# Patient Record
Sex: MALE | Race: OTHER | HISPANIC OR LATINO | Employment: UNEMPLOYED | URBAN - METROPOLITAN AREA
[De-identification: names, ages, dates, MRNs, and addresses within clinical notes are randomized per-mention and may not be internally consistent; named-entity substitution may affect disease eponyms.]

---

## 2022-04-05 ENCOUNTER — HOSPITAL ENCOUNTER (EMERGENCY)
Facility: HOSPITAL | Age: 42
Discharge: ANOTHER HOSPITAL | End: 2022-04-05
Attending: EMERGENCY MEDICINE

## 2022-04-05 VITALS
RESPIRATION RATE: 16 BRPM | OXYGEN SATURATION: 99 % | TEMPERATURE: 99 F | HEART RATE: 68 BPM | HEIGHT: 68 IN | WEIGHT: 140 LBS | BODY MASS INDEX: 21.22 KG/M2 | SYSTOLIC BLOOD PRESSURE: 123 MMHG | DIASTOLIC BLOOD PRESSURE: 62 MMHG

## 2022-04-05 DIAGNOSIS — S61.209A FLEXOR TENDON LACERATION OF FINGER WITH OPEN WOUND, INITIAL ENCOUNTER: Primary | ICD-10-CM

## 2022-04-05 DIAGNOSIS — S61.211A LACERATION OF LEFT INDEX FINGER WITHOUT FOREIGN BODY WITHOUT DAMAGE TO NAIL, INITIAL ENCOUNTER: ICD-10-CM

## 2022-04-05 DIAGNOSIS — S56.129A FLEXOR TENDON LACERATION OF FINGER WITH OPEN WOUND, INITIAL ENCOUNTER: Primary | ICD-10-CM

## 2022-04-05 DIAGNOSIS — S61.012A LACERATION OF LEFT THUMB WITHOUT FOREIGN BODY WITHOUT DAMAGE TO NAIL, INITIAL ENCOUNTER: ICD-10-CM

## 2022-04-05 PROCEDURE — 25000003 PHARM REV CODE 250: Performed by: EMERGENCY MEDICINE

## 2022-04-05 PROCEDURE — 96375 TX/PRO/DX INJ NEW DRUG ADDON: CPT

## 2022-04-05 PROCEDURE — 12001 RPR S/N/AX/GEN/TRNK 2.5CM/<: CPT

## 2022-04-05 PROCEDURE — 90715 TDAP VACCINE 7 YRS/> IM: CPT | Performed by: EMERGENCY MEDICINE

## 2022-04-05 PROCEDURE — 63600175 PHARM REV CODE 636 W HCPCS: Performed by: EMERGENCY MEDICINE

## 2022-04-05 PROCEDURE — 99285 EMERGENCY DEPT VISIT HI MDM: CPT | Mod: 25

## 2022-04-05 PROCEDURE — 96365 THER/PROPH/DIAG IV INF INIT: CPT

## 2022-04-05 PROCEDURE — 12041 INTMD RPR N-HF/GENIT 2.5CM/<: CPT

## 2022-04-05 PROCEDURE — 90471 IMMUNIZATION ADMIN: CPT | Performed by: EMERGENCY MEDICINE

## 2022-04-05 RX ORDER — HYDROMORPHONE HYDROCHLORIDE 2 MG/ML
1.5 INJECTION, SOLUTION INTRAMUSCULAR; INTRAVENOUS; SUBCUTANEOUS
Status: COMPLETED | OUTPATIENT
Start: 2022-04-05 | End: 2022-04-05

## 2022-04-05 RX ORDER — CEFAZOLIN SODIUM 2 G/50ML
2 SOLUTION INTRAVENOUS ONCE
Status: COMPLETED | OUTPATIENT
Start: 2022-04-05 | End: 2022-04-05

## 2022-04-05 RX ORDER — LIDOCAINE HYDROCHLORIDE 10 MG/ML
10 INJECTION INFILTRATION; PERINEURAL
Status: COMPLETED | OUTPATIENT
Start: 2022-04-05 | End: 2022-04-05

## 2022-04-05 RX ADMIN — HYDROMORPHONE HYDROCHLORIDE 1.5 MG: 2 INJECTION, SOLUTION INTRAMUSCULAR; INTRAVENOUS; SUBCUTANEOUS at 01:04

## 2022-04-05 RX ADMIN — LIDOCAINE HYDROCHLORIDE 10 ML: 10 INJECTION, SOLUTION INFILTRATION; PERINEURAL at 02:04

## 2022-04-05 RX ADMIN — CEFAZOLIN SODIUM 2 G: 2 SOLUTION INTRAVENOUS at 03:04

## 2022-04-05 RX ADMIN — TETANUS TOXOID, REDUCED DIPHTHERIA TOXOID AND ACELLULAR PERTUSSIS VACCINE, ADSORBED 0.5 ML: 5; 2.5; 8; 8; 2.5 SUSPENSION INTRAMUSCULAR at 02:04

## 2022-04-05 NOTE — ED NOTES
Pt AAOx4, Abc's intact. NADN. No adverse reaction to medication given. D/C instructions, Transfer Summary, Radiology disc in pt possession along with belongings. No other needs at this time. Pt left with family via POV.

## 2022-04-05 NOTE — ED PROVIDER NOTES
Encounter Date: 4/5/2022       History     Chief Complaint   Patient presents with    Laceration     Patient was cutting herbert with table saw cut his left 1st and 2nd fingers     HPI patient is a 41-year-old man who presents emergency department for evaluation of laceration to his left thumb and index finger that occurred just prior to arrival.  The patient was cutting herbert with a table saw all when he accidentally cut his left hand.  Tetanus is not up-to-date.  Patient sources constant, severe pain and inability to move his index finger.  Review of patient's allergies indicates:  No Known Allergies  No past medical history on file.  No past surgical history on file.  No family history on file.     Review of Systems   Constitutional: Negative for fever.   HENT: Negative for sore throat.    Respiratory: Negative for shortness of breath.    Cardiovascular: Negative for chest pain.   Gastrointestinal: Negative for nausea.   Genitourinary: Negative for dysuria.   Musculoskeletal: Negative for back pain.   Skin: Positive for wound. Negative for rash.   Neurological: Positive for weakness.   Hematological: Does not bruise/bleed easily.       Physical Exam     Initial Vitals [04/05/22 1231]   BP Pulse Resp Temp SpO2   122/73 65 20 97.6 °F (36.4 °C) 99 %      MAP       --         Physical Exam    Nursing note and vitals reviewed.  Constitutional: He appears well-developed and well-nourished.   HENT:   Head: Normocephalic and atraumatic.   Eyes: EOM are normal. Pupils are equal, round, and reactive to light.   Neck: Neck supple.   Cardiovascular: Normal rate.   Pulmonary/Chest: Breath sounds normal. No respiratory distress.   Musculoskeletal:      Left hand: Laceration present. Decreased range of motion. Decreased strength.        Hands:       Cervical back: Neck supple.      Comments: 1.5 cm laceration to the left distal segment of the thumb through the fat pad.    Tourniquet applied to visualize base of the wound  in a clean and bloodless field.  2 cm laceration of the left index finger overlying the PIP joint with complete transection of the flexor tendon.  The joint capsule appears open.  Unable to appreciate foreign bodies or underlying fracture on inspection.     Neurological: He is alert and oriented to person, place, and time.   Skin: Skin is warm and dry.                     ED Course   Lac Repair    Date/Time: 4/5/2022 3:56 PM  Performed by: Saulo Kelley MD  Authorized by: Saulo Kelley MD     Consent:     Consent obtained:  Verbal    Consent given by:  Patient    Risks, benefits, and alternatives were discussed: yes      Risks discussed:  Need for additional repair, tendon damage, retained foreign body, nerve damage, poor wound healing, vascular damage, pain and infection  Universal protocol:     Patient identity confirmed:  Verbally with patient  Anesthesia:     Anesthesia method:  Nerve block    Block needle gauge:  27 G    Block anesthetic:  Lidocaine 1% w/o epi    Block injection procedure:  Anatomic landmarks identified, negative aspiration for blood and incremental injection    Block outcome:  Anesthesia achieved  Laceration details:     Location:  Finger    Finger location:  L index finger    Length (cm):  2  Pre-procedure details:     Preparation:  Patient was prepped and draped in usual sterile fashion and imaging obtained to evaluate for foreign bodies  Exploration:     Limited defect created (wound extended): no      Hemostasis obtained with: via sutures.    Imaging obtained: x-ray      Imaging outcome: foreign body not noted      Wound exploration: wound explored through full range of motion and entire depth of wound visualized      Wound extent: areolar tissue violated, fascia violated, muscle damage, tendon damage and vascular damage      Wound extent: no underlying fracture noted      Tendon damage location:  Upper extremity    Upper extremity tendon damage location:  Finger flexor    Tendon  damage extent:  Complete transection    Tendon repair plan:  Refer for evaluation    Contaminated: no    Treatment:     Area cleansed with:  Chlorhexidine, Shur-Clens and povidone-iodine    Amount of cleaning:  Extensive    Irrigation solution:  Sterile water    Irrigation volume:  1l    Irrigation method:  Syringe    Debridement:  Minimal  Skin repair:     Repair method:  Sutures    Suture size:  4-0    Suture material:  Nylon    Suture technique:  Simple interrupted and horizontal mattress    Number of sutures:  5  Approximation:     Approximation:  Close  Post-procedure details:     Dressing:  Sterile dressing  Comments:          Laceration # 2. Was a 1.5 cm laceration to the distal left thumb.  Digital block performed with lidocaine 1% without epi.  Wound cleansed.  No evidence of nerve or tendon damage.  No evidence of foreign bodies.  Laceration repaired with 7 simple sutures using 4-0 Ethilon sutures.  Approximation was close and sterile bandage applied.  No immediate complications.      Labs Reviewed - No data to display       Imaging Results          X-Ray Hand 3 View Left (Final result)  Result time 04/05/22 14:16:26    Final result by Myla Perez MD (04/05/22 14:16:26)                 Impression:      As above      Electronically signed by: Myla Perez MD  Date:    04/05/2022  Time:    14:16             Narrative:    EXAMINATION:  XR HAND COMPLETE 3 VIEW LEFT    CLINICAL HISTORY:  left hand pain;.    TECHNIQUE:  PA, lateral, and oblique views of the left hand were performed.    COMPARISON:  None    FINDINGS:  Mild scattered degenerative changes.  A bandage on the index finger limit evaluation for subtle findings but as visualized no acute fracture, dislocation or radiopaque foreign body in the soft tissues.  Soft tissue injury with laceration to the index finger                                 Medications   LIDOcaine HCL 10 mg/ml (1%) injection 10 mL (10 mLs Infiltration Given by Other 4/5/22  1402)   HYDROmorphone (PF) injection 1.5 mg (1.5 mg Intravenous Given 4/5/22 1335)   Tdap (BOOSTRIX) vaccine injection 0.5 mL (0.5 mLs Intramuscular Given 4/5/22 1403)   cefazolin (ANCEF) 2 gram in dextrose 5% 50 mL IVPB (premix) (0 g Intravenous Stopped 4/5/22 1544)     Medical Decision Making:   History:   Old Medical Records: I decided to obtain old medical records.  Initial Assessment:   Patient is a 41-year-old man who presents emergency department for accidental laceration to his left index finger and thumb.  Thumb was repaired after cleansing with sutures.  Left index finger revealed evidence of an open joint and flexor tendon transection.  Bleeding difficult to control when tourniquet not applied.  After thorough cleansing and inspection close approximation was performed to control bleeding.  Patient given 2 g of Ancef in the ED and tetanus was updated.  Discussed with Dr. Royal at [facility].  Patient will be transferred to [facility] for evaluation by Hand surgery.                      Clinical Impression:   Final diagnoses:  [S61.211A] Laceration of left index finger without foreign body without damage to nail, initial encounter  [S61.012A] Laceration of left thumb without foreign body without damage to nail, initial encounter  [S56.129A, S61.209A] Flexor tendon laceration of finger with open wound, initial encounter (Primary)          ED Disposition Condition    Transfer to Another Facility Stable              Saulo Kelley MD  04/05/22 0370

## 2022-04-05 NOTE — ED NOTES
Pt report given to PIETRO Maya at [facility]. Pt is enroute with Radiology disc, ED Summary and MD Notes.